# Patient Record
Sex: MALE | Race: WHITE
[De-identification: names, ages, dates, MRNs, and addresses within clinical notes are randomized per-mention and may not be internally consistent; named-entity substitution may affect disease eponyms.]

---

## 2018-06-13 ENCOUNTER — HOSPITAL ENCOUNTER (OUTPATIENT)
Dept: HOSPITAL 62 - HHS | Age: 38
End: 2018-06-13
Payer: COMMERCIAL

## 2018-06-13 DIAGNOSIS — Z12.83: Primary | ICD-10-CM

## 2020-04-29 ENCOUNTER — HOSPITAL ENCOUNTER (OUTPATIENT)
Dept: HOSPITAL 62 - RDC | Age: 40
End: 2020-04-29
Attending: NURSE PRACTITIONER
Payer: COMMERCIAL

## 2020-04-29 VITALS — SYSTOLIC BLOOD PRESSURE: 137 MMHG | DIASTOLIC BLOOD PRESSURE: 90 MMHG

## 2020-04-29 DIAGNOSIS — K21.9: ICD-10-CM

## 2020-04-29 DIAGNOSIS — F43.10: ICD-10-CM

## 2020-04-29 DIAGNOSIS — M79.10: ICD-10-CM

## 2020-04-29 DIAGNOSIS — R51: ICD-10-CM

## 2020-04-29 DIAGNOSIS — R50.9: ICD-10-CM

## 2020-04-29 DIAGNOSIS — Z87.891: ICD-10-CM

## 2020-04-29 DIAGNOSIS — R19.7: ICD-10-CM

## 2020-04-29 DIAGNOSIS — Z87.820: ICD-10-CM

## 2020-04-29 DIAGNOSIS — Z20.828: Primary | ICD-10-CM

## 2020-04-29 DIAGNOSIS — J02.9: ICD-10-CM

## 2020-04-29 LAB
A TYPE INFLUENZA AG: NEGATIVE
B INFLUENZA AG: NEGATIVE

## 2020-04-29 PROCEDURE — 87804 INFLUENZA ASSAY W/OPTIC: CPT

## 2020-04-29 PROCEDURE — 99211 OFF/OP EST MAY X REQ PHY/QHP: CPT

## 2020-04-29 PROCEDURE — 87635 SARS-COV-2 COVID-19 AMP PRB: CPT

## 2020-04-29 PROCEDURE — 87070 CULTURE OTHR SPECIMN AEROBIC: CPT

## 2020-04-29 PROCEDURE — 87880 STREP A ASSAY W/OPTIC: CPT

## 2020-04-29 NOTE — ER RDC ASSESSMENT REPORT
Intake





- In the Last 14 days


Have you traveled outside [place]?: Yes


--City/State: [facility] 4/13-4/24 for work


Have you been in close contact with someone CONFIRMED: Yes


Worked in Healthcare?: No





- Symptoms


Subjective Fever(Felt feverish): No


Chills: No


Muscule Aches: Yes


Runny Nose: No


Sore Throat: Yes


Cough (New or worsening chronic cough): Yes


Shortness of breath: No


Nausea or Vomiting: No


Headache: Yes


Abdominal Pain: No


Diarrhea(3 or more loose stools in last 24 hours): Yes





- Do you have any of the following


Chronic lung disease: Asthma or emphysema or COPD: No


Cystic Fibrosis: No


Diabetes: No


High Blood Pressure: No


Cardiovascular Disease: No


Chronic Kidney Disease: No


Chronic Liver Disease: No


Chronic blood disorder like Sickle Cell Disease: No


Weak immune system due to disease or medication: No


Neurologic condition that limits movement: No


Developmental delay - Moderate to Severe: No


Recent (within past 2 weeks) or current Pregnancy: No


Morbid Obesity (>100 pounds over ideal weight): No


Obesity Comment: 





5'10" 196 lbs





- Objective


Temperature: 97.6 F


Pulse Rate: 78


Respiratory Rate: 14


Blood Pressure: 137/90


O2 Sat by Pulse Oximetry: 92


Objective: 


Given above, testing performed: 





flu a/b, strep


COVID 19 to 


























If Testing Performed:


Test Specimen Type Sent to








Disposition: Home; Selfcare





General





- General


Chief Complaint: Sore Throat


Time Seen by Provider: 04/29/20 12:00


Mode of Arrival: Ambulatory


Information source: Patient





- HPI


Notes: 





39-year-old male presents to [facility] clinic for COVID-19 testing.  Patient states he

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 

[employer]. He states they have had 2 coworkers recently test positive for COVID-19; he

had exposure to these coworkers on 4/23/2020.  He also reports recent travel for

work to [facility] from April 13-26.  Patient has no significant medical 

history beyond tobacco use but quit smoking in 2010, PTSD, and TBI.  Patient 

states onset of symptoms was 4/21/2020.  He states his symptoms were at their 

worst on Saturday and have been improving but they are still persistent.  He 

reports myalgia, sore throat, dry cough, and headache.  Symptoms characterized 

as mild.  Denies any exacerbating factors.  Denies any relieving factors.  He is

denying any fever, chills, shortness of breath, rhinorrhea, nausea vomiting or 

diarrhea, or abdominal pain.





- Related Data


Allergies/Adverse Reactions: 


                                        





No Known Allergies Allergy (Unverified 04/29/20 13:16)


   








Home Medications: Propanolol, omeprazole





Past Medical History





- General


Information source: Patient





- Social History


Smoking Status: Former Smoker


Family History: Reviewed & Not Pertinent





- Past Medical History


Cardiac Medical History: Reports: None


Pulmonary Medical History: Reports: None


EENT Medical History: Reports: None


Neurological Medical History: Reports: Other


Other: 





TBI


Endocrine Medical History: Reports: None


Renal/ Medical History: Reports: None


Malignancy Medical History: Reports None


GI Medical History: Reports: Hx Gastroesophageal Reflux Disease


Musculoskeletal Medical History: Reports None


Skin Medical History: Reports None


Psychiatric Medical History: Reports: Hx Anxiety, Hx Post Traumatic Stress 

Disorder


Traumatic Medical History: Reports: Hx Fractures, Hx Traumatic Brain Injury


Infectious Medical History: Reports: None


Past Surgical History: Reports: Hx Orthopedic Surgery





Physical Exam





- General


General appearance: Appears well


In distress: None


Notes: 





PHYSICAL EXAMINATION: 


GENERAL: Well-appearing and in no acute distress. 


HEAD: Atraumatic, normocephalic. 


EYES: sclera anicteric, conjunctiva are normal. 


ENT: nares patent. Moist mucous membranes. 


NECK: Normal range of motion, supple without lymphadenopathy 


LUNGS: CTAB and equal. No wheezes rales or rhonchi. 


HEART: Regular rate and rhythm without murmurs 


ABDOMEN: Soft, nontender, normal bowel sounds, no guarding. 


EXTREMITIES: Normal range of motion, no pitting edema. No cyanosis. 


NEUROLOGICAL: Cranial nerves grossly intact. Normal speech. 


PSYCH: Normal mood, normal affect. 


SKIN: Warm, Dry, normal turgor, no rashes or lesions noted








Patient Education/Counseling


Counseling/Education: 





Patient presents with upper respiratory symptoms worrisome for possible Covid 

19.  Patient does not have emergency worrying symptoms such as difficulty 

breathing, shortness of breath, chest pain, pressure, confusion or cyanosis.  

However SPO2 sats are lower than what would be expected for somebody of his age 

and physical condition.  He is currently denying any shortness of breath with 

activity or at rest.  Advised to seek follow-up should the symptoms occur.  At 

this time, patient appears suitable for discharge as vital signs are stable and 

patient is nontoxic in appearance.  Good return precautions have been discussed 

with patient, patient verbalized understanding and is agreeable with discharge 

plan of care at this time.


Guidance for worsening S/SX: 





As a person under investigation for Covid 19, the [facility] of 

Health and Human Services, division of public health advises you to adhere to 

the following guidance until your test results are reported to you.  If your 

test result is positive, you will receive additional information from your 

provider and your local health department at that time.


 


Remain at home until you are cleared by the health provider or public health 

authorities.


 


Keep a log of visitors to your home, notify any visitors to your home of your 

isolation status.


 


If you plan to move to a new address or leave the county, notify the local 

health department in your County.


 


Call your doctor or seek care if you have an urgent medical need.  Before 

seeking medical care, call ahead to get instructions from the provider before 

arriving at the medical office clinic or hospital.  Notify them that you are 

being tested for the virus that causes Covid 19 so that arrangements can be 

made, as necessary, to prevent transmission to others in the healthcare setting.

 Next, notify the local health department in your county.


 


If a medical emergency arises and you need to call 911, inform the first 

responders that you are being tested for the virus that causes Covid 19.  Next, 

notify the local health department in your county.





RDC Discharge





- Discharge


Clinical Impression: 


 covid 19 screen





Condition: Stable


Disposition: Home; Selfcare